# Patient Record
Sex: FEMALE | Race: WHITE | HISPANIC OR LATINO
[De-identification: names, ages, dates, MRNs, and addresses within clinical notes are randomized per-mention and may not be internally consistent; named-entity substitution may affect disease eponyms.]

---
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
## 2021-03-12 PROBLEM — Z00.00 ENCOUNTER FOR PREVENTIVE HEALTH EXAMINATION: Status: ACTIVE | Noted: 2021-03-12

## 2021-05-23 ENCOUNTER — EMERGENCY (EMERGENCY)
Facility: HOSPITAL | Age: 21
LOS: 1 days | Discharge: HOME | End: 2021-05-23
Attending: EMERGENCY MEDICINE
Payer: COMMERCIAL

## 2021-05-23 VITALS
SYSTOLIC BLOOD PRESSURE: 112 MMHG | DIASTOLIC BLOOD PRESSURE: 73 MMHG | TEMPERATURE: 99 F | RESPIRATION RATE: 14 BRPM | HEART RATE: 83 BPM | OXYGEN SATURATION: 98 %

## 2021-05-23 VITALS
OXYGEN SATURATION: 98 % | HEART RATE: 83 BPM | SYSTOLIC BLOOD PRESSURE: 106 MMHG | RESPIRATION RATE: 15 BRPM | TEMPERATURE: 99 F | DIASTOLIC BLOOD PRESSURE: 70 MMHG | HEIGHT: 64 IN | WEIGHT: 110.01 LBS

## 2021-05-23 LAB
ALBUMIN SERPL ELPH-MCNC: 4.3 G/DL — SIGNIFICANT CHANGE UP (ref 3.3–5)
ALP SERPL-CCNC: 41 U/L — SIGNIFICANT CHANGE UP (ref 40–120)
ALT FLD-CCNC: 16 U/L — SIGNIFICANT CHANGE UP (ref 10–45)
ANION GAP SERPL CALC-SCNC: 11 MMOL/L — SIGNIFICANT CHANGE UP (ref 5–17)
APPEARANCE UR: CLEAR — SIGNIFICANT CHANGE UP
AST SERPL-CCNC: 21 U/L — SIGNIFICANT CHANGE UP (ref 10–40)
BACTERIA # UR AUTO: NEGATIVE — SIGNIFICANT CHANGE UP
BASOPHILS # BLD AUTO: 0.09 K/UL — SIGNIFICANT CHANGE UP (ref 0–0.2)
BASOPHILS NFR BLD AUTO: 0.6 % — SIGNIFICANT CHANGE UP (ref 0–2)
BILIRUB SERPL-MCNC: 0.3 MG/DL — SIGNIFICANT CHANGE UP (ref 0.2–1.2)
BILIRUB UR-MCNC: NEGATIVE — SIGNIFICANT CHANGE UP
BUN SERPL-MCNC: 9 MG/DL — SIGNIFICANT CHANGE UP (ref 7–23)
CALCIUM SERPL-MCNC: 8.9 MG/DL — SIGNIFICANT CHANGE UP (ref 8.4–10.5)
CHLORIDE SERPL-SCNC: 108 MMOL/L — SIGNIFICANT CHANGE UP (ref 96–108)
CO2 SERPL-SCNC: 18 MMOL/L — LOW (ref 22–31)
COLOR SPEC: YELLOW — SIGNIFICANT CHANGE UP
CREAT SERPL-MCNC: 0.57 MG/DL — SIGNIFICANT CHANGE UP (ref 0.5–1.3)
DIFF PNL FLD: ABNORMAL
EOSINOPHIL # BLD AUTO: 0.14 K/UL — SIGNIFICANT CHANGE UP (ref 0–0.5)
EOSINOPHIL NFR BLD AUTO: 0.9 % — SIGNIFICANT CHANGE UP (ref 0–6)
EPI CELLS # UR: 2 /HPF — SIGNIFICANT CHANGE UP
GLUCOSE SERPL-MCNC: 83 MG/DL — SIGNIFICANT CHANGE UP (ref 70–99)
GLUCOSE UR QL: NEGATIVE — SIGNIFICANT CHANGE UP
HCT VFR BLD CALC: 35.6 % — SIGNIFICANT CHANGE UP (ref 34.5–45)
HGB BLD-MCNC: 11.6 G/DL — SIGNIFICANT CHANGE UP (ref 11.5–15.5)
HYALINE CASTS # UR AUTO: 0 /LPF — SIGNIFICANT CHANGE UP (ref 0–2)
IMM GRANULOCYTES NFR BLD AUTO: 0.4 % — SIGNIFICANT CHANGE UP (ref 0–1.5)
KETONES UR-MCNC: NEGATIVE — SIGNIFICANT CHANGE UP
LEUKOCYTE ESTERASE UR-ACNC: NEGATIVE — SIGNIFICANT CHANGE UP
LYMPHOCYTES # BLD AUTO: 0.82 K/UL — LOW (ref 1–3.3)
LYMPHOCYTES # BLD AUTO: 5.5 % — LOW (ref 13–44)
MCHC RBC-ENTMCNC: 31.2 PG — SIGNIFICANT CHANGE UP (ref 27–34)
MCHC RBC-ENTMCNC: 32.6 GM/DL — SIGNIFICANT CHANGE UP (ref 32–36)
MCV RBC AUTO: 95.7 FL — SIGNIFICANT CHANGE UP (ref 80–100)
MONOCYTES # BLD AUTO: 0.54 K/UL — SIGNIFICANT CHANGE UP (ref 0–0.9)
MONOCYTES NFR BLD AUTO: 3.6 % — SIGNIFICANT CHANGE UP (ref 2–14)
NEUTROPHILS # BLD AUTO: 13.18 K/UL — HIGH (ref 1.8–7.4)
NEUTROPHILS NFR BLD AUTO: 89 % — HIGH (ref 43–77)
NITRITE UR-MCNC: NEGATIVE — SIGNIFICANT CHANGE UP
NRBC # BLD: 0 /100 WBCS — SIGNIFICANT CHANGE UP (ref 0–0)
PH UR: 6 — SIGNIFICANT CHANGE UP (ref 5–8)
PLATELET # BLD AUTO: 325 K/UL — SIGNIFICANT CHANGE UP (ref 150–400)
POTASSIUM SERPL-MCNC: 4.1 MMOL/L — SIGNIFICANT CHANGE UP (ref 3.5–5.3)
POTASSIUM SERPL-SCNC: 4.1 MMOL/L — SIGNIFICANT CHANGE UP (ref 3.5–5.3)
PROT SERPL-MCNC: 7.4 G/DL — SIGNIFICANT CHANGE UP (ref 6–8.3)
PROT UR-MCNC: ABNORMAL
RBC # BLD: 3.72 M/UL — LOW (ref 3.8–5.2)
RBC # FLD: 12.4 % — SIGNIFICANT CHANGE UP (ref 10.3–14.5)
RBC CASTS # UR COMP ASSIST: 220 /HPF — HIGH (ref 0–4)
SODIUM SERPL-SCNC: 137 MMOL/L — SIGNIFICANT CHANGE UP (ref 135–145)
SP GR SPEC: 1.03 — HIGH (ref 1.01–1.02)
UROBILINOGEN FLD QL: NEGATIVE — SIGNIFICANT CHANGE UP
WBC # BLD: 14.83 K/UL — HIGH (ref 3.8–10.5)
WBC # FLD AUTO: 14.83 K/UL — HIGH (ref 3.8–10.5)
WBC UR QL: 8 /HPF — HIGH (ref 0–5)

## 2021-05-23 PROCEDURE — 93975 VASCULAR STUDY: CPT

## 2021-05-23 PROCEDURE — 87086 URINE CULTURE/COLONY COUNT: CPT

## 2021-05-23 PROCEDURE — 99285 EMERGENCY DEPT VISIT HI MDM: CPT

## 2021-05-23 PROCEDURE — 80053 COMPREHEN METABOLIC PANEL: CPT

## 2021-05-23 PROCEDURE — 81001 URINALYSIS AUTO W/SCOPE: CPT

## 2021-05-23 PROCEDURE — 76856 US EXAM PELVIC COMPLETE: CPT | Mod: 26,59

## 2021-05-23 PROCEDURE — 99284 EMERGENCY DEPT VISIT MOD MDM: CPT | Mod: 25

## 2021-05-23 PROCEDURE — 93975 VASCULAR STUDY: CPT | Mod: 26

## 2021-05-23 PROCEDURE — 85025 COMPLETE CBC W/AUTO DIFF WBC: CPT

## 2021-05-23 PROCEDURE — 76856 US EXAM PELVIC COMPLETE: CPT

## 2021-05-23 RX ORDER — SODIUM CHLORIDE 9 MG/ML
1000 INJECTION INTRAMUSCULAR; INTRAVENOUS; SUBCUTANEOUS ONCE
Refills: 0 | Status: COMPLETED | OUTPATIENT
Start: 2021-05-23 | End: 2021-05-23

## 2021-05-23 RX ADMIN — SODIUM CHLORIDE 1000 MILLILITER(S): 9 INJECTION INTRAMUSCULAR; INTRAVENOUS; SUBCUTANEOUS at 11:41

## 2021-05-23 NOTE — ED PROVIDER NOTE - CLINICAL SUMMARY MEDICAL DECISION MAKING FREE TEXT BOX
21 y/o female with no pmhx presenting with abdominal pain x 1 day, currently menstruating, pain free at this time, not sexually active, abdomen NTTP. Likely cramping from menstrual cycle. Will check UA/UC for hematuria for low suspicion of stone. Transabdominal u/s for low suspicion for ovarian torsion vs. cyst rupture. No pain medication required at this time. Will continue to reassess.

## 2021-05-23 NOTE — ED PROVIDER NOTE - NSFOLLOWUPINSTRUCTIONS_ED_ALL_ED_FT
You were seen today in the emergency room for abdominal pain. Although the testing done today indicates that your pain is not from an acute emergency, your pain could still represent a more serious problem. Most commonly, the pain you are having is likely not something serious and will resolve in a few days, however testing was done today based on the symptoms that you currently have; so if you develop new or worsening symptoms this could be a sign of a problem that was not tested for and means you should come back to the emergency room or see your doctor urgently. You need to follow up with your doctor in the next 48-72 hours. If you develop any new or worsening symptoms you need to return immediately to the emergency department. If you experience any of the following please come right back to the emergency room: severe nausea and vomiting with inability to tolerate eating, severe worsening of your pain, a new fever, new bleeding in stool or vomit, confusion, new numbness or weakness, passing out.       Dysmenorrhea    WHAT YOU NEED TO KNOW:    What is dysmenorrhea? Dysmenorrhea is painful menstrual cramps at or around the time of your monthly period.    Female Reproductive System         What causes dysmenorrhea? Your body normally produces chemicals each month to help your uterus contract. When too many of these chemicals are made, your uterus contracts too much and causes pain. Dysmenorrhea may also be caused by any of the following:  •Abnormal structure of your uterus or vagina      •A narrow cervix      •Growth in or on your uterus or ovaries      •Medical conditions, such as pelvic inflammatory disease, endometriosis, or uterine fibroids      •A copper intrauterine device (IUD)      What increases my risk for dysmenorrhea?   •Never been pregnant      •Obesity      •Smoking      •Family history of painful menstrual cramps      •Pelvic infection      •Longer monthly period cycle      •Medical conditions, such as a sexually transmitted infection or endometriosis      What are the signs and symptoms of dysmenorrhea?   •Mild to severe pain      •Cramping pain in lower abdomen or low back      •Bloating      •Headache      •Diarrhea      How is dysmenorrhea diagnosed? Your healthcare provider can usually diagnose dysmenorrhea by your signs and symptoms. Tell him or her when your symptoms started and if you have pain between your monthly periods. He or she may ask if anything relieves your pain, such as heat or medicine. Tell your provider if you are sexually active or have ever been pregnant. You may need any of the following:  •A blood test will check for pregnancy.      •A pelvic exam may be needed to check the size and shape of your uterus and ovaries. Your healthcare provider gently inserts a warmed speculum into your vagina. A speculum is a tool that opens your vagina to show your cervix.      •A cervical culture may be needed to check for infection. Your healthcare provider will use a swab to collect a sample of cells from your cervix. This will be sent to a lab for tests.      •An ultrasound will show abnormal structure of your reproductive organs. Sound waves are used to show pictures on a monitor.      How is dysmenorrhea treated? Dysmenorrhea can be controlled with lifestyle changes and medicines. It usually improves with age and pregnancy.  •Medicines: ?NSAIDs help decrease swelling and pain or fever. This medicine is available with or without a doctor's order. NSAIDs can cause stomach bleeding or kidney problems in certain people. If you take blood thinner medicine, always ask your healthcare provider if NSAIDs are safe for you. Always read the medicine label and follow directions.      ?Birth control medicine may help decrease your pain. This medicine may be birth control pills or an IUD that does not contain copper.      •Transcutaneous electric nerve stimulation (TENS), is a device used to stimulate your nerves and decrease pain. Ask your healthcare provider for more information about TENS.      How can I manage my symptoms?   •Eat low-fat foods. Increase the amount of vegetables and raw seeds you eat. Ask your healthcare provider if you should take vitamin B or magnesium supplements. These will help decrease your pain. Do not eat dairy products or eggs.      •Apply heat on your lower abdomen for 20 to 30 minutes every 2 hours for as many days as directed. Heat helps decrease pain and muscle spasms.      •Manage your stress. Stress can make your symptoms worse. Try relaxation exercises, such as deep breathing.      •Exercise regularly. Ask your healthcare provider about the best exercise plan for you. Exercise can help decrease pain.   FAMILY WALKING FOR EXERCISE           •Keep a record of your pain. Write down when your pain and periods start and stop. Bring the record with you to your follow-up visits.      •Do not smoke. Avoid others who smoke. If you smoke, it is never too late to quit. Smoking can increase your risk for dysmenorrhea. Ask your healthcare provider for information if you need help quitting.      When should I contact my healthcare provider?   •You have anxiety or feel depressed.      •Your periods are early, late, or more painful than usual.      •You have questions or concerns about your condition or care.      When should I seek immediate care or call 911?   •You have severe pain.      •You have heavy vaginal bleeding and you feel faint.      •You have sudden chest pain and trouble breathing. The [facility] doctor information is attached below as you requested.     You were seen today in the emergency room for abdominal pain. Although the testing done today indicates that your pain is not from an acute emergency, your pain could still represent a more serious problem. Most commonly, the pain you are having is likely not something serious and will resolve in a few days, however testing was done today based on the symptoms that you currently have; so if you develop new or worsening symptoms this could be a sign of a problem that was not tested for and means you should come back to the emergency room or see your doctor urgently. You need to follow up with your doctor in the next 48-72 hours. If you develop any new or worsening symptoms you need to return immediately to the emergency department. If you experience any of the following please come right back to the emergency room: severe nausea and vomiting with inability to tolerate eating, severe worsening of your pain, a new fever, new bleeding in stool or vomit, confusion, new numbness or weakness, passing out.       Dysmenorrhea    WHAT YOU NEED TO KNOW:    What is dysmenorrhea? Dysmenorrhea is painful menstrual cramps at or around the time of your monthly period.    Female Reproductive System         What causes dysmenorrhea? Your body normally produces chemicals each month to help your uterus contract. When too many of these chemicals are made, your uterus contracts too much and causes pain. Dysmenorrhea may also be caused by any of the following:  •Abnormal structure of your uterus or vagina      •A narrow cervix      •Growth in or on your uterus or ovaries      •Medical conditions, such as pelvic inflammatory disease, endometriosis, or uterine fibroids      •A copper intrauterine device (IUD)      What increases my risk for dysmenorrhea?   •Never been pregnant      •Obesity      •Smoking      •Family history of painful menstrual cramps      •Pelvic infection      •Longer monthly period cycle      •Medical conditions, such as a sexually transmitted infection or endometriosis      What are the signs and symptoms of dysmenorrhea?   •Mild to severe pain      •Cramping pain in lower abdomen or low back      •Bloating      •Headache      •Diarrhea      How is dysmenorrhea diagnosed? Your healthcare provider can usually diagnose dysmenorrhea by your signs and symptoms. Tell him or her when your symptoms started and if you have pain between your monthly periods. He or she may ask if anything relieves your pain, such as heat or medicine. Tell your provider if you are sexually active or have ever been pregnant. You may need any of the following:  •A blood test will check for pregnancy.      •A pelvic exam may be needed to check the size and shape of your uterus and ovaries. Your healthcare provider gently inserts a warmed speculum into your vagina. A speculum is a tool that opens your vagina to show your cervix.      •A cervical culture may be needed to check for infection. Your healthcare provider will use a swab to collect a sample of cells from your cervix. This will be sent to a lab for tests.      •An ultrasound will show abnormal structure of your reproductive organs. Sound waves are used to show pictures on a monitor.      How is dysmenorrhea treated? Dysmenorrhea can be controlled with lifestyle changes and medicines. It usually improves with age and pregnancy.  •Medicines: ?NSAIDs help decrease swelling and pain or fever. This medicine is available with or without a doctor's order. NSAIDs can cause stomach bleeding or kidney problems in certain people. If you take blood thinner medicine, always ask your healthcare provider if NSAIDs are safe for you. Always read the medicine label and follow directions.      ?Birth control medicine may help decrease your pain. This medicine may be birth control pills or an IUD that does not contain copper.      •Transcutaneous electric nerve stimulation (TENS), is a device used to stimulate your nerves and decrease pain. Ask your healthcare provider for more information about TENS.      How can I manage my symptoms?   •Eat low-fat foods. Increase the amount of vegetables and raw seeds you eat. Ask your healthcare provider if you should take vitamin B or magnesium supplements. These will help decrease your pain. Do not eat dairy products or eggs.      •Apply heat on your lower abdomen for 20 to 30 minutes every 2 hours for as many days as directed. Heat helps decrease pain and muscle spasms.      •Manage your stress. Stress can make your symptoms worse. Try relaxation exercises, such as deep breathing.      •Exercise regularly. Ask your healthcare provider about the best exercise plan for you. Exercise can help decrease pain.   FAMILY WALKING FOR EXERCISE           •Keep a record of your pain. Write down when your pain and periods start and stop. Bring the record with you to your follow-up visits.      •Do not smoke. Avoid others who smoke. If you smoke, it is never too late to quit. Smoking can increase your risk for dysmenorrhea. Ask your healthcare provider for information if you need help quitting.      When should I contact my healthcare provider?   •You have anxiety or feel depressed.      •Your periods are early, late, or more painful than usual.      •You have questions or concerns about your condition or care.      When should I seek immediate care or call 911?   •You have severe pain.      •You have heavy vaginal bleeding and you feel faint.      •You have sudden chest pain and trouble breathing.

## 2021-05-23 NOTE — ED PROVIDER NOTE - NS ED ROS FT
Gen: Denies fever, weight loss  CV: Denies chest pain, palpitations  Skin: Denies rash, erythema, color changes  Resp: Denies SOB, cough  Endo: Denies sensitivity to heat, cold, increased urination  GI: Denies diarrhea, constipation, nausea, vomiting  Msk: Denies back pain, LE swelling, extremity pain  : Denies dysuria, increased frequency  Neuro: Denies LOC, weakness, numbness/tingling

## 2021-05-23 NOTE — ED PROVIDER NOTE - OBJECTIVE STATEMENT
19 y/o female with no pmhx presenting with abdominal pain x 1 day. Abdominal pain is localized to b/l lower abdomen, no radiation, waxing and waning, with no associated fevers/chills, n/v/d, cough, rashes, or changes in urination. Patient reports pain was severe at one point causing her to feel lightheaded. Patient is currently menstruating and reports normal amount of bleeding in infrequent changing of tampons with no associated vaginal pain, discharge, or odors. Patient is not sexually active and reports that she has never been sexually active. Patient pain free at this time.

## 2021-05-23 NOTE — ED PROVIDER NOTE - ATTENDING CONTRIBUTION TO CARE
I performed a history and physical exam of the patient and discussed their management with the resident. I reviewed the resident's note and agree with the documented findings and plan of care.  Vijaya Escobar MD

## 2021-05-23 NOTE — ED PROVIDER NOTE - PATIENT PORTAL LINK FT
You can access the FollowMyHealth Patient Portal offered by [facility] by registering at the following website: [email or web address]. By joining [facility]’s FollowMyHealth portal, you will also be able to view your health information using other applications (apps) compatible with our system.

## 2021-05-23 NOTE — ED ADULT NURSE NOTE - OBJECTIVE STATEMENT
19 yo presents to the ED from home. A&Ox4, ambulatory c/o abd pain. pt reports generalized pain in abdomen region. pt denies urinary symptoms. pt reports nausea, denies vomiting. pt is currently on her menstrual cycle, pt denies similar pain. 19 yo presents to the ED from home. A&Ox4, ambulatory c/o abd pain. pt reports generalized pain in abdomen region. pt denies urinary symptoms. pt reports nausea, denies vomiting. pt is currently on her menstrual cycle, pt denies similar pain in the past. pt took 2 tablets at home for pain control but pt is unsure which medication "maybe Tylenol". 20G RAC. Patient undressed and placed into gown, call bell in hand and side rails up for safety. warm blanket provided, vital signs stable, pt in no acute distress.

## 2021-05-23 NOTE — ED PROVIDER NOTE - NSFOLLOWUPCLINICS_GEN_ALL_ED_FT
[facility] Gynecology and Obstetrics  Gynceology/OB  [address] [place] [address]  Phone: [phone]  Fax:

## 2021-05-23 NOTE — ED ADULT NURSE NOTE - NSFALLRSKASSESSDT_ED_ALL_ED
23-May-2021 12:04 Ivermectin Counseling:  Patient instructed to take medication on an empty stomach with a full glass of water.  Patient informed of potential adverse effects including but not limited to nausea, diarrhea, dizziness, itching, and swelling of the extremities or lymph nodes.  The patient verbalized understanding of the proper use and possible adverse effects of ivermectin.  All of the patient's questions and concerns were addressed.

## 2021-05-23 NOTE — ED PROVIDER NOTE - PHYSICAL EXAMINATION
Gen: WDWN, NAD  HEENT: EOMI, no nasal discharge, mucous membranes moist  CV: RRR, +S1/S2, no M/R/G  Resp: CTAB, no W/R/R  GI: Abdomen soft non-distended, NTTP, no masses  MSK: No CVA tenderness, no open wounds, no bruising, no LE edema  Neuro:  A&Ox4, following commands, moving all four extremities spontaneously  Psych: appropriate mood

## 2021-05-24 LAB
CULTURE RESULTS: SIGNIFICANT CHANGE UP
SPECIMEN SOURCE: SIGNIFICANT CHANGE UP

## 2021-05-26 ENCOUNTER — TRANSCRIPTION ENCOUNTER (OUTPATIENT)
Age: 21
End: 2021-05-26

## 2021-12-02 ENCOUNTER — TRANSCRIPTION ENCOUNTER (OUTPATIENT)
Age: 21
End: 2021-12-02

## 2021-12-04 ENCOUNTER — TRANSCRIPTION ENCOUNTER (OUTPATIENT)
Age: 21
End: 2021-12-04

## 2022-05-23 ENCOUNTER — NON-APPOINTMENT (OUTPATIENT)
Age: 22
End: 2022-05-23

## 2023-11-13 ENCOUNTER — APPOINTMENT (OUTPATIENT)
Dept: OBGYN | Facility: CLINIC | Age: 23
End: 2023-11-13
Payer: COMMERCIAL

## 2023-11-13 VITALS
WEIGHT: 100 LBS | DIASTOLIC BLOOD PRESSURE: 60 MMHG | SYSTOLIC BLOOD PRESSURE: 90 MMHG | BODY MASS INDEX: 17.07 KG/M2 | HEIGHT: 64 IN

## 2023-11-13 DIAGNOSIS — Z78.9 OTHER SPECIFIED HEALTH STATUS: ICD-10-CM

## 2023-11-13 DIAGNOSIS — Z01.411 ENCOUNTER FOR GYNECOLOGICAL EXAMINATION (GENERAL) (ROUTINE) WITH ABNORMAL FINDINGS: ICD-10-CM

## 2023-11-13 DIAGNOSIS — Z30.09 ENCOUNTER FOR OTHER GENERAL COUNSELING AND ADVICE ON CONTRACEPTION: ICD-10-CM

## 2023-11-13 DIAGNOSIS — N89.8 OTHER SPECIFIED NONINFLAMMATORY DISORDERS OF VAGINA: ICD-10-CM

## 2023-11-13 PROCEDURE — 99385 PREV VISIT NEW AGE 18-39: CPT

## 2023-11-14 LAB
C TRACH RRNA SPEC QL NAA+PROBE: NOT DETECTED
CANDIDA VAG CYTO: NOT DETECTED
G VAGINALIS+PREV SP MTYP VAG QL MICRO: NOT DETECTED
HPV HIGH+LOW RISK DNA PNL CVX: NOT DETECTED
N GONORRHOEA RRNA SPEC QL NAA+PROBE: NOT DETECTED
SOURCE TP AMPLIFICATION: NORMAL
T VAGINALIS VAG QL WET PREP: NOT DETECTED

## 2023-11-17 LAB — CYTOLOGY CVX/VAG DOC THIN PREP: NORMAL

## 2023-12-04 ENCOUNTER — NON-APPOINTMENT (OUTPATIENT)
Age: 23
End: 2023-12-04

## 2023-12-04 ENCOUNTER — APPOINTMENT (OUTPATIENT)
Dept: OBGYN | Facility: CLINIC | Age: 23
End: 2023-12-04
Payer: COMMERCIAL

## 2023-12-04 VITALS
SYSTOLIC BLOOD PRESSURE: 90 MMHG | DIASTOLIC BLOOD PRESSURE: 60 MMHG | WEIGHT: 100 LBS | HEIGHT: 64 IN | BODY MASS INDEX: 17.07 KG/M2

## 2023-12-04 DIAGNOSIS — Z30.430 ENCOUNTER FOR INSERTION OF INTRAUTERINE CONTRACEPTIVE DEVICE: ICD-10-CM

## 2023-12-04 LAB
HCG UR QL: NEGATIVE
QUALITY CONTROL: YES

## 2023-12-04 PROCEDURE — 81025 URINE PREGNANCY TEST: CPT

## 2023-12-04 PROCEDURE — 58300 INSERT INTRAUTERINE DEVICE: CPT

## 2023-12-04 RX ORDER — DOXYCYCLINE HYCLATE 100 MG/1
100 TABLET ORAL
Qty: 14 | Refills: 0 | Status: ACTIVE | COMMUNITY
Start: 2023-12-04 | End: 1900-01-01

## 2024-01-05 ENCOUNTER — APPOINTMENT (OUTPATIENT)
Dept: OBGYN | Facility: CLINIC | Age: 24
End: 2024-01-05

## 2024-01-19 ENCOUNTER — APPOINTMENT (OUTPATIENT)
Dept: OBGYN | Facility: CLINIC | Age: 24
End: 2024-01-19

## 2024-11-26 NOTE — ED PROVIDER NOTE - IV ALTEPASE ADMIN HIDDEN
Dr. Robles, from anesthesia, said that her procedures need to be done at the hospital because of her cardiac hx and a recent abnormal stress test. show